# Patient Record
Sex: FEMALE | Race: BLACK OR AFRICAN AMERICAN | Employment: UNEMPLOYED | URBAN - METROPOLITAN AREA
[De-identification: names, ages, dates, MRNs, and addresses within clinical notes are randomized per-mention and may not be internally consistent; named-entity substitution may affect disease eponyms.]

---

## 2019-06-24 ENCOUNTER — OFFICE VISIT (OUTPATIENT)
Dept: OBGYN CLINIC | Facility: CLINIC | Age: 64
End: 2019-06-24
Payer: COMMERCIAL

## 2019-06-24 VITALS
DIASTOLIC BLOOD PRESSURE: 82 MMHG | HEIGHT: 64 IN | SYSTOLIC BLOOD PRESSURE: 134 MMHG | WEIGHT: 226 LBS | BODY MASS INDEX: 38.58 KG/M2

## 2019-06-24 DIAGNOSIS — N84.1 ENDOCERVICAL POLYP: Primary | ICD-10-CM

## 2019-06-24 DIAGNOSIS — N84.1 MUCOUS POLYP OF CERVIX: ICD-10-CM

## 2019-06-24 PROCEDURE — 57500 BIOPSY OF CERVIX: CPT | Performed by: OBSTETRICS & GYNECOLOGY

## 2019-06-24 PROCEDURE — 88305 TISSUE EXAM BY PATHOLOGIST: CPT | Performed by: OBSTETRICS & GYNECOLOGY

## 2019-06-24 NOTE — PATIENT INSTRUCTIONS
[facility] Department of OB/GYN  After Care Instructions for Colposcopy/Biopsy      Biopsy Results   You will receive a phone call with your biopsy results in 7 business days. If you have not received your biopsy results in 7 days, please contact our office.   Wilfredo Meza

## 2019-06-25 NOTE — PROGRESS NOTES
Here for cervical polyp removal    Betadine prep of cervix and removal of polyp  Hemostasis intact    Will call with biopsy results

## 2019-06-26 ENCOUNTER — TELEPHONE (OUTPATIENT)
Dept: OBGYN CLINIC | Facility: CLINIC | Age: 64
End: 2019-06-26

## 2019-06-26 NOTE — TELEPHONE ENCOUNTER
Received in [facility] records from [address] [place] [address] in Dr Pastrana's bin for review

## 2020-01-24 ENCOUNTER — APPOINTMENT (OUTPATIENT)
Dept: GENERAL RADIOLOGY | Age: 65
End: 2020-01-24
Attending: PHYSICIAN ASSISTANT
Payer: COMMERCIAL

## 2020-01-24 ENCOUNTER — HOSPITAL ENCOUNTER (EMERGENCY)
Age: 65
Discharge: HOME | End: 2020-01-24
Payer: COMMERCIAL

## 2020-01-24 VITALS
WEIGHT: 220 LBS | RESPIRATION RATE: 16 BRPM | TEMPERATURE: 99 F | OXYGEN SATURATION: 96 % | HEIGHT: 63 IN | HEART RATE: 74 BPM | DIASTOLIC BLOOD PRESSURE: 60 MMHG | SYSTOLIC BLOOD PRESSURE: 130 MMHG | BODY MASS INDEX: 38.98 KG/M2

## 2020-01-24 DIAGNOSIS — J20.8 VIRAL BRONCHITIS: Primary | ICD-10-CM

## 2020-01-24 DIAGNOSIS — J02.9 VIRAL PHARYNGITIS: ICD-10-CM

## 2020-01-24 LAB
POCT INFLUENZA A: NEGATIVE
POCT INFLUENZA B: NEGATIVE

## 2020-01-24 PROCEDURE — 87081 CULTURE SCREEN ONLY: CPT | Performed by: PHYSICIAN ASSISTANT

## 2020-01-24 PROCEDURE — 99284 EMERGENCY DEPT VISIT MOD MDM: CPT | Performed by: PHYSICIAN ASSISTANT

## 2020-01-24 PROCEDURE — 71046 X-RAY EXAM CHEST 2 VIEWS: CPT | Performed by: PHYSICIAN ASSISTANT

## 2020-01-24 PROCEDURE — 87430 STREP A AG IA: CPT | Performed by: PHYSICIAN ASSISTANT

## 2020-01-24 PROCEDURE — 87502 INFLUENZA DNA AMP PROBE: CPT | Performed by: PHYSICIAN ASSISTANT

## 2020-01-24 RX ORDER — FLUTICASONE PROPIONATE 50 MCG
SPRAY, SUSPENSION (ML) NASAL DAILY
COMMUNITY

## 2020-01-24 RX ORDER — DEXAMETHASONE SODIUM PHOSPHATE 4 MG/ML
10 VIAL (ML) INJECTION ONCE
Status: COMPLETED | OUTPATIENT
Start: 2020-01-24 | End: 2020-01-24

## 2020-01-25 NOTE — ED PROVIDER NOTES
Patient Seen in: [facility] In [place]      History   Patient presents with:  Cough/URI    Stated Complaint: [address] COMPLETED Z-PAC TODAY.   DENIES FEVER    60-year-old -American female with a history of arthri 19   Ht 160 cm (5' 3\")   Wt 99.8 kg   SpO2 96%   BMI 38.97 kg/m²         Physical Exam  Vitals signs and nursing note reviewed. Constitutional:       General: She is not in acute distress. Appearance: Normal appearance. She is obese.  She is not ill- COUGH.  COMPLETED Z-PAC TODAY. DENIES FEVER  COMPARISON:  None. TECHNIQUE:  PA and lateral chest radiographs were obtained.   PATIENT STATED HISTORY: (As transcribed by Technologist)  Productive cough, short of breath, nasal congestion, slight decrease ap

## 2020-07-20 ENCOUNTER — MED REC SCAN ONLY (OUTPATIENT)
Dept: OBGYN CLINIC | Facility: CLINIC | Age: 65
End: 2020-07-20

## 2021-09-13 ENCOUNTER — HOSPITAL ENCOUNTER (OUTPATIENT)
Dept: MRI IMAGING | Age: 66
Discharge: HOME | End: 2021-09-13
Attending: FAMILY MEDICINE
Payer: COMMERCIAL

## 2021-09-13 DIAGNOSIS — M54.9 BACK PAIN: ICD-10-CM

## 2021-09-13 DIAGNOSIS — M54.10 RADICULOPATHY: ICD-10-CM

## 2021-09-13 PROCEDURE — 72146 MRI CHEST SPINE W/O DYE: CPT | Performed by: FAMILY MEDICINE

## 2021-09-13 PROCEDURE — 72148 MRI LUMBAR SPINE W/O DYE: CPT | Performed by: FAMILY MEDICINE

## 2021-09-13 PROCEDURE — 72141 MRI NECK SPINE W/O DYE: CPT | Performed by: FAMILY MEDICINE

## 2021-10-30 ENCOUNTER — HOSPITAL ENCOUNTER (EMERGENCY)
Age: 66
Discharge: HOME | End: 2021-10-30
Attending: EMERGENCY MEDICINE
Payer: COMMERCIAL

## 2021-10-30 VITALS
RESPIRATION RATE: 15 BRPM | BODY MASS INDEX: 38.98 KG/M2 | DIASTOLIC BLOOD PRESSURE: 78 MMHG | HEART RATE: 71 BPM | SYSTOLIC BLOOD PRESSURE: 143 MMHG | OXYGEN SATURATION: 100 % | TEMPERATURE: 97 F | HEIGHT: 63 IN | WEIGHT: 220 LBS

## 2021-10-30 DIAGNOSIS — R42 VERTIGO: Primary | ICD-10-CM

## 2021-10-30 DIAGNOSIS — R19.7 DIARRHEA, UNSPECIFIED TYPE: ICD-10-CM

## 2021-10-30 PROCEDURE — 99284 EMERGENCY DEPT VISIT MOD MDM: CPT

## 2021-10-30 PROCEDURE — 85025 COMPLETE CBC W/AUTO DIFF WBC: CPT | Performed by: EMERGENCY MEDICINE

## 2021-10-30 PROCEDURE — 93005 ELECTROCARDIOGRAM TRACING: CPT

## 2021-10-30 PROCEDURE — 96360 HYDRATION IV INFUSION INIT: CPT

## 2021-10-30 PROCEDURE — 93010 ELECTROCARDIOGRAM REPORT: CPT

## 2021-10-30 PROCEDURE — 80053 COMPREHEN METABOLIC PANEL: CPT | Performed by: EMERGENCY MEDICINE

## 2021-10-30 RX ORDER — MECLIZINE HYDROCHLORIDE 25 MG/1
25 TABLET ORAL ONCE
Status: COMPLETED | OUTPATIENT
Start: 2021-10-30 | End: 2021-10-30

## 2021-10-30 RX ORDER — MECLIZINE HYDROCHLORIDE 25 MG/1
25 TABLET ORAL 3 TIMES DAILY PRN
Qty: 10 TABLET | Refills: 0 | Status: SHIPPED | OUTPATIENT
Start: 2021-10-30

## 2021-10-30 NOTE — ED PROVIDER NOTES
Patient Seen in: [facility] In [place]      History   Patient presents with:  Dizziness    Stated Complaint: pt feeling like room spinning and diarrhea for 5 days, seems to be getting wors*    Subjective:   HPI    21-year-old female h Exam    Vital signs reviewed. Nursing note reviewed.   Constitutional: Alert, well-appearing  Head: Normocephalic, atraumatic  Mouth: Moist  Ears: Cerumen impaction bilaterally  Eyes: Extraocular muscles intact, pupils equal  Cardiovascular: Regular rate a aggressively at home with water and Gatorade. Follow-up PCP within next 2 days to ensure she is feeling better. Also contact information for vestibular rehab physical therapy given to patient in case she does not improve as well as ENT and neurology.

## 2023-02-26 ENCOUNTER — HOSPITAL ENCOUNTER (EMERGENCY)
Age: 68
Discharge: HOME | End: 2023-02-26
Attending: EMERGENCY MEDICINE
Payer: COMMERCIAL

## 2023-02-26 ENCOUNTER — APPOINTMENT (OUTPATIENT)
Dept: GENERAL RADIOLOGY | Age: 68
End: 2023-02-26
Payer: COMMERCIAL

## 2023-02-26 VITALS
DIASTOLIC BLOOD PRESSURE: 92 MMHG | BODY MASS INDEX: 40.93 KG/M2 | OXYGEN SATURATION: 97 % | HEART RATE: 88 BPM | TEMPERATURE: 98 F | WEIGHT: 231 LBS | SYSTOLIC BLOOD PRESSURE: 162 MMHG | RESPIRATION RATE: 16 BRPM | HEIGHT: 63 IN

## 2023-02-26 DIAGNOSIS — M25.552 LEFT HIP PAIN: Primary | ICD-10-CM

## 2023-02-26 PROCEDURE — 73502 X-RAY EXAM HIP UNI 2-3 VIEWS: CPT | Performed by: EMERGENCY MEDICINE

## 2023-02-26 PROCEDURE — 99284 EMERGENCY DEPT VISIT MOD MDM: CPT

## 2023-02-26 PROCEDURE — 99283 EMERGENCY DEPT VISIT LOW MDM: CPT

## 2023-02-26 RX ORDER — PREDNISONE 20 MG/1
40 TABLET ORAL DAILY
Qty: 10 TABLET | Refills: 0 | Status: SHIPPED | OUTPATIENT
Start: 2023-02-26 | End: 2023-03-03

## 2023-02-26 RX ORDER — PREDNISONE 20 MG/1
40 TABLET ORAL ONCE
Status: COMPLETED | OUTPATIENT
Start: 2023-02-26 | End: 2023-02-26

## 2023-02-26 NOTE — ED INITIAL ASSESSMENT (HPI)
Left hip pain since Friday - denies falling - states she just leaned forward to help  pick someone up

## 2024-05-04 ENCOUNTER — HOSPITAL ENCOUNTER (EMERGENCY)
Age: 69
Discharge: HOME | End: 2024-05-04
Payer: COMMERCIAL

## 2024-05-04 VITALS
WEIGHT: 246 LBS | BODY MASS INDEX: 43.59 KG/M2 | OXYGEN SATURATION: 94 % | SYSTOLIC BLOOD PRESSURE: 178 MMHG | HEIGHT: 63 IN | DIASTOLIC BLOOD PRESSURE: 97 MMHG | RESPIRATION RATE: 18 BRPM | TEMPERATURE: 98 F | HEART RATE: 78 BPM

## 2024-05-04 DIAGNOSIS — M54.16 LUMBAR RADICULOPATHY: Primary | ICD-10-CM

## 2024-05-04 PROCEDURE — 99283 EMERGENCY DEPT VISIT LOW MDM: CPT

## 2024-05-04 PROCEDURE — 99284 EMERGENCY DEPT VISIT MOD MDM: CPT

## 2024-05-04 RX ORDER — METHYLPREDNISOLONE 4 MG/1
TABLET ORAL
Qty: 1 EACH | Refills: 0 | Status: SHIPPED | OUTPATIENT
Start: 2024-05-04

## 2024-05-04 RX ORDER — BACLOFEN 10 MG/1
10 TABLET ORAL 3 TIMES DAILY
Qty: 20 TABLET | Refills: 0 | Status: SHIPPED | OUTPATIENT
Start: 2024-05-04

## 2024-05-05 NOTE — ED PROVIDER NOTES
Patient Seen in: [facility] In [place]      History     Chief Complaint   Patient presents with    Back Pain     Stated Complaint: L back pain that radiates down left leg    Subjective:   70 yo female presents to the emergency department with c/o low back pain.  Patient states she has been having left sided back pain for a few weeks.  She has been able to manage it at home with Tylenol and Ibuprofen.  However, over the last 2 days the pain has been worse and radiates into her left leg.  She has been using topical lidocaine patches to help with the pain over for the last 2 days as well.  She last took ibuprofen 600 mg around 1930 tonight.  She denies any recent injury; however, states that she did fall in the tub several weeks ago and injured her right knee.  She did not think at the time that she injured her back however she feels now she may have wrenched it when she fell.  She denies any numbness, tingling, saddle paresthesia, loss of bowel or bladder control.    The history is provided by the patient.         Objective:   Past Medical History:    Arthritis    Depression    Unspecified essential hypertension              Past Surgical History:   Procedure Laterality Date    Breast surgery      Other surgical history      bunionectomy on both feet     Other surgical history      right breast bx     Tubal ligation                  Social History     Socioeconomic History    Marital status:    Tobacco Use    Smoking status: Former     Current packs/day: 0.00     Types: Cigarettes     Quit date:      Years since quittin.3     Passive exposure: Never    Smokeless tobacco: Never   Vaping Use    Vaping status: Never Used   Substance and Sexual Activity    Alcohol use: Not Currently    Drug use: Not Currently    Sexual activity: Not Currently   Other Topics Concern    Caffeine Concern Yes     Comment: couple times weekly     Exercise No    Seat Belt Yes              Review of Systems    Constitutional: Negative.    Musculoskeletal:  Positive for back pain and myalgias.   Neurological: Negative.  Negative for weakness and numbness.   All other systems reviewed and are negative.      Positive for stated complaint: L back pain that radiates down left leg  Other systems are as noted in HPI.  Constitutional and vital signs reviewed.      All other systems reviewed and negative except as noted above.    Physical Exam     ED Triage Vitals [05/04/24 2221]   BP (!) 178/97   Pulse 78   Resp 18   Temp 98.4 °F (36.9 °C)   Temp src Temporal   SpO2 94 %   O2 Device        Current:BP (!) 178/97   Pulse 78   Temp 98.4 °F (36.9 °C) (Temporal)   Resp 18   Ht 160 cm (5' 3\")   Wt 111.6 kg   SpO2 94%   BMI 43.58 kg/m²         Physical Exam  Vitals and nursing note reviewed.   Constitutional:       General: She is not in acute distress.     Appearance: Normal appearance. She is obese. She is not ill-appearing.   HENT:      Head: Normocephalic and atraumatic.      Mouth/Throat:      Mouth: Mucous membranes are moist.      Pharynx: Oropharynx is clear.   Eyes:      Conjunctiva/sclera: Conjunctivae normal.      Pupils: Pupils are equal, round, and reactive to light.   Cardiovascular:      Rate and Rhythm: Normal rate and regular rhythm.      Pulses: Normal pulses.      Heart sounds: Normal heart sounds. No murmur heard.  Pulmonary:      Effort: Pulmonary effort is normal. No respiratory distress.      Breath sounds: Normal breath sounds.   Musculoskeletal:         General: Tenderness present. No swelling, deformity or signs of injury. Normal range of motion.      Comments: Tenderness with palpation of the left lower lumbar paraspinal muscles, sacral paraspinal muscles, and left SI joint.  No vertebral point tenderness, crepitus, or step-offs.  Mild to moderate muscle spasm present.  ROM, motor strength, and sensation intact.  Dorsiflexion and plantarflexion are 5 out of 5 and equal bilaterally.  Negative SLR  bilaterally.  DP is 2+.   Skin:     General: Skin is warm and dry.      Capillary Refill: Capillary refill takes less than 2 seconds.   Neurological:      General: No focal deficit present.      Mental Status: She is alert and oriented to person, place, and time.   Psychiatric:         Mood and Affect: Mood normal.         Behavior: Behavior normal.           ED Course   Labs Reviewed - No data to display                 MDM                             Medical Decision Making  69-year-old female with lumbar radiculopathy.  Do not feel the patient requires any emergent imaging at this time.  Will plan to manage medically with over-the-counter Tylenol, ibuprofen, prescription for muscle relaxers, and Medrol Dosepak.  Discussed with patient supportive management at home including use of heat and ice to help with pain control.  Recommended patient follow-up with her PCP for possible physical therapy if not improving in 1 week.  No evidence of fracture, acute cord compression, or cauda equina.  Patient drove herself to the emergency room, and therefore was not able to receive medication in the department.    Risk  OTC drugs.  Prescription drug management.        Disposition and Plan     Clinical Impression:  1. Lumbar radiculopathy         Disposition:  Discharge  5/4/2024 10:49 pm    Follow-up:  Vaughn Kaplan MD  [address] [place] [id number]  [phone]    Follow up in 1 week(s)            Medications Prescribed:  Current Discharge Medication List        START taking these medications    Details   baclofen 10 MG Oral Tab Take 1 tablet (10 mg total) by mouth 3 (three) times daily.  Qty: 20 tablet, Refills: 0      methylPREDNISolone (MEDROL) 4 MG Oral Tablet Therapy Pack Dosepack: take as directed  Qty: 1 each, Refills: 0

## 2024-05-05 NOTE — DISCHARGE INSTRUCTIONS
Rest and drink plenty of fluids.   Apply ice 20 minutes on and then 40 minutes off several times a day.  Alternate ice with warm, moist compresses.   Take Tylenol and/or ibuprofen as needed for pain.   Use the muscle relaxer for muscle stiffness or soreness.   Start the Medrol dose pack tomorrow and make sure to take it with food as it can upset your stomach.   After the acute pain improves, start with light stretching or yoga to help prevent further injury.   Follow up with your PCP in 5-7 days.     Thank you for choosing [facility] for your care.

## 2024-05-06 ENCOUNTER — APPOINTMENT (OUTPATIENT)
Dept: MRI IMAGING | Age: 69
End: 2024-05-06
Attending: EMERGENCY MEDICINE
Payer: COMMERCIAL

## 2024-05-06 ENCOUNTER — HOSPITAL ENCOUNTER (EMERGENCY)
Age: 69
Discharge: HOME | End: 2024-05-06
Attending: EMERGENCY MEDICINE
Payer: COMMERCIAL

## 2024-05-06 VITALS
WEIGHT: 246 LBS | TEMPERATURE: 98 F | OXYGEN SATURATION: 95 % | BODY MASS INDEX: 43.59 KG/M2 | HEART RATE: 72 BPM | DIASTOLIC BLOOD PRESSURE: 85 MMHG | RESPIRATION RATE: 18 BRPM | HEIGHT: 63 IN | SYSTOLIC BLOOD PRESSURE: 171 MMHG

## 2024-05-06 DIAGNOSIS — M54.16 LEFT LUMBAR RADICULITIS: Primary | ICD-10-CM

## 2024-05-06 LAB
ALBUMIN SERPL-MCNC: 3.8 G/DL (ref 3.4–5)
ALBUMIN/GLOB SERPL: 0.7 {RATIO} (ref 1–2)
ALP LIVER SERPL-CCNC: 57 U/L
ALT SERPL-CCNC: 53 U/L
ANION GAP SERPL CALC-SCNC: 4 MMOL/L (ref 0–18)
BASOPHILS # BLD AUTO: 0.04 X10(3) UL (ref 0–0.2)
BASOPHILS NFR BLD AUTO: 0.4 %
BILIRUB SERPL-MCNC: 0.4 MG/DL (ref 0.1–2)
BUN BLD-MCNC: 17 MG/DL (ref 9–23)
CALCIUM BLD-MCNC: 9.4 MG/DL (ref 8.5–10.1)
CHLORIDE SERPL-SCNC: 101 MMOL/L (ref 98–112)
CO2 SERPL-SCNC: 26 MMOL/L (ref 21–32)
CREAT BLD-MCNC: 0.63 MG/DL
EGFRCR SERPLBLD CKD-EPI 2021: 97 ML/MIN/1.73M2 (ref 60–?)
EOSINOPHIL # BLD AUTO: 0.01 X10(3) UL (ref 0–0.7)
EOSINOPHIL NFR BLD AUTO: 0.1 %
ERYTHROCYTE [DISTWIDTH] IN BLOOD BY AUTOMATED COUNT: 13.2 %
GLOBULIN PLAS-MCNC: 5.4 G/DL (ref 2.8–4.4)
GLUCOSE BLD-MCNC: 101 MG/DL (ref 70–99)
HCT VFR BLD AUTO: 43.7 %
HGB BLD-MCNC: 14.9 G/DL
IMM GRANULOCYTES # BLD AUTO: 0.04 X10(3) UL (ref 0–1)
IMM GRANULOCYTES NFR BLD: 0.4 %
LYMPHOCYTES # BLD AUTO: 1.77 X10(3) UL (ref 1–4)
LYMPHOCYTES NFR BLD AUTO: 18.6 %
MCH RBC QN AUTO: 30.7 PG (ref 26–34)
MCHC RBC AUTO-ENTMCNC: 34.1 G/DL (ref 31–37)
MCV RBC AUTO: 89.9 FL
MONOCYTES # BLD AUTO: 0.67 X10(3) UL (ref 0.1–1)
MONOCYTES NFR BLD AUTO: 7 %
NEUTROPHILS # BLD AUTO: 6.99 X10 (3) UL (ref 1.5–7.7)
NEUTROPHILS # BLD AUTO: 6.99 X10(3) UL (ref 1.5–7.7)
NEUTROPHILS NFR BLD AUTO: 73.5 %
OSMOLALITY SERPL CALC.SUM OF ELEC: 274 MOSM/KG (ref 275–295)
PLATELET # BLD AUTO: 337 10(3)UL (ref 150–450)
PROT SERPL-MCNC: 9.2 G/DL (ref 6.4–8.2)
RBC # BLD AUTO: 4.86 X10(6)UL
SODIUM SERPL-SCNC: 131 MMOL/L (ref 136–145)
WBC # BLD AUTO: 9.5 X10(3) UL (ref 4–11)

## 2024-05-06 PROCEDURE — 96374 THER/PROPH/DIAG INJ IV PUSH: CPT

## 2024-05-06 PROCEDURE — 85025 COMPLETE CBC W/AUTO DIFF WBC: CPT | Performed by: EMERGENCY MEDICINE

## 2024-05-06 PROCEDURE — 80053 COMPREHEN METABOLIC PANEL: CPT | Performed by: EMERGENCY MEDICINE

## 2024-05-06 PROCEDURE — 99285 EMERGENCY DEPT VISIT HI MDM: CPT

## 2024-05-06 PROCEDURE — 96375 TX/PRO/DX INJ NEW DRUG ADDON: CPT

## 2024-05-06 PROCEDURE — 96376 TX/PRO/DX INJ SAME DRUG ADON: CPT

## 2024-05-06 RX ORDER — HYDROCODONE BITARTRATE AND ACETAMINOPHEN 5; 325 MG/1; MG/1
1 TABLET ORAL EVERY 6 HOURS PRN
Qty: 10 TABLET | Refills: 0 | Status: SHIPPED | OUTPATIENT
Start: 2024-05-06 | End: 2024-05-11

## 2024-05-06 RX ORDER — DIAZEPAM 5 MG/1
5 TABLET ORAL ONCE
Status: COMPLETED | OUTPATIENT
Start: 2024-05-06 | End: 2024-05-06

## 2024-05-06 RX ORDER — MORPHINE SULFATE 4 MG/ML
4 INJECTION, SOLUTION INTRAMUSCULAR; INTRAVENOUS EVERY 30 MIN PRN
Status: DISCONTINUED | OUTPATIENT
Start: 2024-05-06 | End: 2024-05-06

## 2024-05-06 RX ORDER — HYDROMORPHONE HYDROCHLORIDE 1 MG/ML
0.5 INJECTION, SOLUTION INTRAMUSCULAR; INTRAVENOUS; SUBCUTANEOUS ONCE
Status: COMPLETED | OUTPATIENT
Start: 2024-05-06 | End: 2024-05-06

## 2024-05-06 RX ORDER — ONDANSETRON 4 MG/1
4 TABLET, ORALLY DISINTEGRATING ORAL EVERY 4 HOURS PRN
Qty: 10 TABLET | Refills: 0 | Status: SHIPPED | OUTPATIENT
Start: 2024-05-06 | End: 2024-05-13

## 2024-05-06 RX ORDER — HYDROMORPHONE HYDROCHLORIDE 1 MG/ML
INJECTION, SOLUTION INTRAMUSCULAR; INTRAVENOUS; SUBCUTANEOUS
Status: COMPLETED
Start: 2024-05-06 | End: 2024-05-06

## 2024-05-06 RX ORDER — KETOROLAC TROMETHAMINE 15 MG/ML
15 INJECTION, SOLUTION INTRAMUSCULAR; INTRAVENOUS ONCE
Status: COMPLETED | OUTPATIENT
Start: 2024-05-06 | End: 2024-05-06

## 2024-05-06 RX ORDER — ONDANSETRON 2 MG/ML
4 INJECTION INTRAMUSCULAR; INTRAVENOUS ONCE
Status: COMPLETED | OUTPATIENT
Start: 2024-05-06 | End: 2024-05-06

## 2024-05-06 RX ORDER — HYDROMORPHONE HYDROCHLORIDE 1 MG/ML
1 INJECTION, SOLUTION INTRAMUSCULAR; INTRAVENOUS; SUBCUTANEOUS ONCE
Status: COMPLETED | OUTPATIENT
Start: 2024-05-06 | End: 2024-05-06

## 2024-05-06 RX ORDER — HYDROCODONE BITARTRATE AND ACETAMINOPHEN 5; 325 MG/1; MG/1
1 TABLET ORAL EVERY 6 HOURS PRN
Qty: 10 TABLET | Refills: 0 | Status: SHIPPED | OUTPATIENT
Start: 2024-05-06 | End: 2024-05-06

## 2024-05-06 RX ORDER — LOSARTAN POTASSIUM 100 MG/1
TABLET ORAL DAILY
COMMUNITY

## 2024-05-06 NOTE — PROGRESS NOTES
Oxygen sats drop to 89% when pt is sleeping. Pt placed on 2L oxygen via nasal cannula. MD notified.

## 2024-05-06 NOTE — ED QUICK NOTES
Per MO in MRI, pt requesting to get out of the MRI machine, states cannot tolerate It despite medications for pain and nausea. This is patients 2nd failed attempt to MRI.

## 2024-05-06 NOTE — ED PROVIDER NOTES
Patient Seen in: [facility] In [place]      History     Chief Complaint   Patient presents with    Back Pain     Stated Complaint: left side lower back pain radiating to left leg. patient seen on saturday, but *    Subjective:   HPI    69-year-old with a history of arthritis, hypertension, depression presents for evaluation of left leg pain.  In March she had an episode of pain in her left buttocks, sore orthopedics, was given steroids and muscle relaxers which resolved her symptoms.  Most recently, 2 weeks ago, had the same pain in her L buttocks. Worse with certain movements.  For the last couple days has been radiating down the back of her left leg to her lower calf.  It is constant.  Does not go into her foot.  It in her back with it 1 day but that is now resolved.  Worse with movement or trying to walk on it. Also hurts to sit or lay down. No weakness or numbness. No fever. No incontinence of bowel or bladder. No saddle numbness. 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  Was here / for the same symptoms, was prescribed steroids and muscle relaxers, but this time the meds aren't helping.  Her Ortho had ordered an MRI back in March but since her symptoms resolved she did not have it done, she tried to schedule it recently but the order had .  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 Felt a \"pop\" in her hip, was off work for 5 weeks, and saw ortho.  She did PT, took meds.   Records reviewed patient saw the PA at the spine surgeons office on 3/21 and was diagnosed with left leg radiculitis.  Objective:   Past Medical History:    Arthritis    Depression    Unspecified essential hypertension              Past Surgical History:   Procedure Laterality Date    Breast surgery      Other surgical history      bunionectomy on both feet     Other surgical history       right breast bx     Tubal ligation                  Social History     Socioeconomic History    Marital status:    Tobacco Use    Smoking status: Former     Current packs/day: 0.00     Types: Cigarettes     Quit date:      Years since quittin.3     Passive exposure: Never    Smokeless tobacco: Never   Vaping Use    Vaping status: Never Used   Substance and Sexual Activity    Alcohol use: Not Currently    Drug use: Not Currently    Sexual activity: Not Currently   Other Topics Concern    Caffeine Concern Yes     Comment: couple times weekly     Exercise No    Seat Belt Yes              Review of Systems    Positive for stated complaint: left side lower back pain radiating to left leg. patient seen on saturday, but *  Other systems are as noted in HPI.  Constitutional and vital signs reviewed.      All other systems reviewed and negative except as noted above.    Physical Exam     ED Triage Vitals   BP 24 1207 (!) 191/101   Pulse 24 1207 90   Resp 24 1207 20   Temp 24 1207 97.9 °F (36.6 °C)   Temp src 24 1207 Temporal   SpO2 24 1207 95 %   O2 Device 24 1345 None (Room air)       Current:BP (!) 171/85   Pulse 72   Temp 97.9 °F (36.6 °C) (Temporal)   Resp 18   Ht 160 cm (5' 3\")   Wt 111.6 kg   SpO2 95%   BMI 43.58 kg/m²         Physical Exam    General: Patient is awake, alert in no acute distress.   HEENT:  Sclera are not icteric.  Conjunctivae within normal limits.  Mucous members are moist.   Cardiovascular: Regular rate and rhythm, normal S1-S2.  Respiratory: Lungs are clear to auscultation bilaterally.   Abdomen: Soft, nontender, nondistended.  Back: No CVA tenderness. No midline thoracic or lumbar tenderness.  Extremities: Negative straight leg raise bilaterally.  Vascular: 2+ DP/PT pulses right lower extremity  Neuro: No saddle anesthesia.  Normal strength and sensation bilateral LE.    ED Course     Labs Reviewed   COMP METABOLIC PANEL (14) -  Abnormal; Notable for the following components:       Result Value    Glucose 101 (*)     Sodium 131 (*)     Calculated Osmolality 274 (*)     Total Protein 9.2 (*)     Globulin  5.4 (*)     A/G Ratio 0.7 (*)     All other components within normal limits   CBC WITH DIFFERENTIAL WITH PLATELET    Narrative:     The following orders were created for panel order CBC With Differential With Platelet.  Procedure                               Abnormality         Status                     ---------                               -----------         ------                     CBC W/ DIFFERENTIAL[[id number]]                              Final result                 Please view results for these tests on the individual orders.   CBC W/ DIFFERENTIAL     MRI lumbar spine: Patient did not tolerate        Morphine, Toradol,  p.o. Valium.  Dilaudid as well for pain       MDM          Previous records reviewed as noted in HPI    Differential includes, but is not limited to, lumbar radiculopathy, ruptured AAA, metastatic disease to the spine    Review of any laboratory testing: CBC is normal; goes against ruptured AAA.     Review of any radiographic studies: Patient did not tolerate MRI    Shared decision making with the patient.  Patient unable to tolerate the MRI.  Would like to be discharged at this point, has upcoming appointment with spine.  She is neurovascularly intact, afebrile, do not suspect more worrisome condition such as cauda equina, etc.   was queried.  She can try some Norco in addition to the other medication she has been taking.    Return for new or worsening symptoms such as weakness numbness incontinence or fever                                                        Medical Decision Making      Disposition and Plan     Clinical Impression:  1. Left lumbar radiculitis         Disposition:  Discharge  5/6/2024  7:36 pm    Follow-up:  [address]  [facility] [address] [phone]  Call  choose option 2 for neurosurgery or pain follow up          Medications Prescribed:  Current Discharge Medication List        START taking these medications    Details   HYDROcodone-acetaminophen 5-325 MG Oral Tab Take 1 tablet by mouth every 6 (six) hours as needed for Pain.  Qty: 10 tablet, Refills: 0    Associated Diagnoses: Left lumbar radiculitis      ondansetron 4 MG Oral Tablet Dispersible Take 1 tablet (4 mg total) by mouth every 4 (four) hours as needed for Nausea.  Qty: 10 tablet, Refills: 0

## 2024-05-07 NOTE — DISCHARGE INSTRUCTIONS
Return for new or worsening symptoms such as weakness, complete numbness especially in the groin, incontinence, fever, vomiting or abdominal pain.    Followup with primary care as you may need further testing and/or treatment such as physical therapy or MRI if your symptoms do not improve.    The pain medication you are prescribed will make you constipated.  Make sure you increse the fiber in your diet, and you can take stool softeners and/or laxatives such as colace, magnesium citrate, milk of magnesia, MiraLax, etc.

## (undated) NOTE — ED AVS SNAPSHOT
[facility] Records   MRN: [id number]    Department:  [facility] in [place]   Date of Visit:  1/24/2020           Disclosure     Insurance plans vary and the physician(s) referred by the ER may not be covered by your plan.  Please cont tell this physician (or your personal doctor if your instructions are to return to your personal doctor) about any new or lasting problems. The primary care or specialist physician will see patients referred from the [facility] Emergency Department.  Sudheer Sheridan

## (undated) NOTE — LETTER
Date & Time: 5/6/2024, 8:15 PM  Patient: Ava Newman  Encounter Provider(s):    Muna Myers MD       To Whom It May Concern:    Ava Newman was seen and treated in our department on 5/6/2024. She should not return to work until 5/10/24 .    If you have any questions or concerns, please do not hesitate to call.        _____________________________  Physician/APC Signature